# Patient Record
Sex: MALE | Race: WHITE
[De-identification: names, ages, dates, MRNs, and addresses within clinical notes are randomized per-mention and may not be internally consistent; named-entity substitution may affect disease eponyms.]

---

## 2018-02-15 ENCOUNTER — HOSPITAL ENCOUNTER (OUTPATIENT)
Dept: HOSPITAL 65 - RAD | Age: 63
Discharge: HOME | End: 2018-02-15
Attending: ORTHOPAEDIC SURGERY
Payer: COMMERCIAL

## 2018-02-15 DIAGNOSIS — M16.11: Primary | ICD-10-CM

## 2018-02-15 PROCEDURE — 20610 DRAIN/INJ JOINT/BURSA W/O US: CPT

## 2018-02-15 PROCEDURE — 77002 NEEDLE LOCALIZATION BY XRAY: CPT

## 2018-03-15 VITALS — SYSTOLIC BLOOD PRESSURE: 144 MMHG | DIASTOLIC BLOOD PRESSURE: 83 MMHG

## 2018-03-15 LAB
ALP INTEST CFR SERPL: 57 U/L (ref 50–136)
ALT SERPL-CCNC: 19 U/L (ref 12–78)
AST SERPL-CCNC: 17 U/L (ref 0–35)
BASOPHILS # BLD AUTO: 0 10^3/UL (ref 0–0.1)
BASOPHILS NFR BLD AUTO: 0.8 % (ref 0–0.2)
CALCIUM SERPL-MCNC: 8.9 MG/DL (ref 8.4–10.5)
CO2 BLDA-SCNC: 26.4 MMOL/L (ref 20–32)
EOSINOPHIL # BLD AUTO: 0.2 10^3/UL (ref 0–0.2)
EOSINOPHIL NFR BLD AUTO: 3.6 % (ref 0–5)
ERYTHROCYTE [DISTWIDTH] IN BLOOD BY AUTOMATED COUNT: 12 % (ref 11.5–14.5)
GLUCOSE PRE 100 G GLC PO SERPL-MCNC: 99 MG/DL (ref 70–110)
HGB BLD-MCNC: 13.7 G/DL (ref 13.9–16.3)
LYMPHOCYTES # BLD AUTO: 1.7 10^3/UL (ref 1–4.8)
LYMPHOCYTES NFR BLD AUTO: 32.3 % (ref 24–44)
MCH RBC QN AUTO: 31.2 PG (ref 26–34)
MCHC RBC AUTO-ENTMCNC: 35.2 G/DL (ref 33–37)
MCV RBC AUTO: 88.6 FL (ref 78–100)
MONOCYTES # BLD AUTO: 0.9 10^3/UL (ref 0.3–0.8)
MONOCYTES NFR BLD AUTO: 17.1 % (ref 5–12)
NEUTROPHILS # BLD AUTO: 2.4 10^3/UL (ref 1.8–7.7)
NEUTROPHILS NFR BLD AUTO: 46.2 % (ref 41–85)
PLATELET # BLD AUTO: 252 10^3/UL (ref 150–400)
PMV BLD AUTO: 8.6 FL (ref 7.8–11)
WBC # BLD AUTO: 5.3 10^3/UL (ref 4.5–11)

## 2018-03-15 NOTE — PCM.EKG
[facility]

                                       

Test Date:    2018-03-15               Test Time:    16:03:45

Pat Name:     ESCOBAR MAJOR           Department:   

Patient ID:   [id number]          Room:         

Gender:       M                        Technician:   DENA

:          1955               Requested By: VIRGILIO DELATORRE

Order Number: [id number]            Reading MD:     

                                 Measurements

Intervals                              Axis          

Rate:         57                       P:            64

MN:           182                      QRS:          63

QRSD:         98                       T:            65

QT:           436                                    

QTc:          424                                    

                           Interpretive Statements

Sinus bradycardia

Otherwise normal ECG

No previous ECG available for comparison



Please click the below link to view image of tracing.

## 2018-03-19 ENCOUNTER — HOSPITAL ENCOUNTER (INPATIENT)
Dept: HOSPITAL 65 - MS | Age: 63
LOS: 3 days | Discharge: HOME | DRG: 470 | End: 2018-03-22
Attending: ORTHOPAEDIC SURGERY | Admitting: ORTHOPAEDIC SURGERY
Payer: COMMERCIAL

## 2018-03-19 VITALS — DIASTOLIC BLOOD PRESSURE: 97 MMHG | SYSTOLIC BLOOD PRESSURE: 178 MMHG

## 2018-03-19 VITALS — DIASTOLIC BLOOD PRESSURE: 99 MMHG | SYSTOLIC BLOOD PRESSURE: 163 MMHG

## 2018-03-19 VITALS — SYSTOLIC BLOOD PRESSURE: 119 MMHG | DIASTOLIC BLOOD PRESSURE: 67 MMHG

## 2018-03-19 VITALS — DIASTOLIC BLOOD PRESSURE: 71 MMHG | SYSTOLIC BLOOD PRESSURE: 123 MMHG

## 2018-03-19 VITALS — DIASTOLIC BLOOD PRESSURE: 74 MMHG | SYSTOLIC BLOOD PRESSURE: 118 MMHG

## 2018-03-19 VITALS — DIASTOLIC BLOOD PRESSURE: 62 MMHG | SYSTOLIC BLOOD PRESSURE: 118 MMHG

## 2018-03-19 VITALS — HEIGHT: 68 IN | WEIGHT: 177.38 LBS | BODY MASS INDEX: 26.88 KG/M2

## 2018-03-19 VITALS — SYSTOLIC BLOOD PRESSURE: 143 MMHG | DIASTOLIC BLOOD PRESSURE: 75 MMHG

## 2018-03-19 VITALS — SYSTOLIC BLOOD PRESSURE: 165 MMHG | DIASTOLIC BLOOD PRESSURE: 98 MMHG

## 2018-03-19 VITALS — DIASTOLIC BLOOD PRESSURE: 71 MMHG | SYSTOLIC BLOOD PRESSURE: 126 MMHG

## 2018-03-19 DIAGNOSIS — Z88.6: ICD-10-CM

## 2018-03-19 DIAGNOSIS — M16.11: Primary | ICD-10-CM

## 2018-03-19 DIAGNOSIS — I25.10: ICD-10-CM

## 2018-03-19 DIAGNOSIS — Z82.49: ICD-10-CM

## 2018-03-19 DIAGNOSIS — Z98.49: ICD-10-CM

## 2018-03-19 DIAGNOSIS — G40.909: ICD-10-CM

## 2018-03-19 DIAGNOSIS — D63.8: ICD-10-CM

## 2018-03-19 DIAGNOSIS — I10: ICD-10-CM

## 2018-03-19 DIAGNOSIS — Z88.2: ICD-10-CM

## 2018-03-19 DIAGNOSIS — Z80.0: ICD-10-CM

## 2018-03-19 DIAGNOSIS — Z96.642: ICD-10-CM

## 2018-03-19 LAB
CALCIUM SERPL-MCNC: 9.3 MG/DL (ref 8.4–10.5)
CO2 BLDA-SCNC: 25.6 MMOL/L (ref 20–32)
ERYTHROCYTE [DISTWIDTH] IN BLOOD BY AUTOMATED COUNT: 12.1 % (ref 11.5–14.5)
GLUCOSE PRE 100 G GLC PO SERPL-MCNC: 112 MG/DL (ref 70–110)
HGB BLD-MCNC: 12.6 G/DL (ref 13.9–16.3)
MCH RBC QN AUTO: 30.9 PG (ref 26–34)
MCHC RBC AUTO-ENTMCNC: 34.1 G/DL (ref 33–37)
MCV RBC AUTO: 90.7 FL (ref 78–100)
PLATELET # BLD AUTO: 218 10^3/UL (ref 150–400)
PMV BLD AUTO: 8.3 FL (ref 7.8–11)
WBC # BLD AUTO: 8.5 10^3/UL (ref 4.5–11)

## 2018-03-19 PROCEDURE — 64447 NJX AA&/STRD FEMORAL NRV IMG: CPT

## 2018-03-19 PROCEDURE — 93005 ELECTROCARDIOGRAM TRACING: CPT

## 2018-03-19 PROCEDURE — C1776 JOINT DEVICE (IMPLANTABLE): HCPCS

## 2018-03-19 PROCEDURE — 73502 X-RAY EXAM HIP UNI 2-3 VIEWS: CPT

## 2018-03-19 PROCEDURE — 76000 FLUOROSCOPY <1 HR PHYS/QHP: CPT

## 2018-03-19 PROCEDURE — G8987 SELF CARE CURRENT STATUS: HCPCS

## 2018-03-19 PROCEDURE — 36415 COLL VENOUS BLD VENIPUNCTURE: CPT

## 2018-03-19 PROCEDURE — 97166 OT EVAL MOD COMPLEX 45 MIN: CPT

## 2018-03-19 PROCEDURE — 97161 PT EVAL LOW COMPLEX 20 MIN: CPT

## 2018-03-19 PROCEDURE — G8988 SELF CARE GOAL STATUS: HCPCS

## 2018-03-19 PROCEDURE — A4338 INDWELLING CATHETER LATEX: HCPCS

## 2018-03-19 PROCEDURE — 80048 BASIC METABOLIC PNL TOTAL CA: CPT

## 2018-03-19 PROCEDURE — 80053 COMPREHEN METABOLIC PANEL: CPT

## 2018-03-19 PROCEDURE — 85027 COMPLETE CBC AUTOMATED: CPT

## 2018-03-19 PROCEDURE — 0SR9049 REPLACEMENT OF RIGHT HIP JOINT WITH CERAMIC ON POLYETHYLENE SYNTHETIC SUBSTITUTE, CEMENTED, OPEN APPROACH: ICD-10-PCS | Performed by: ORTHOPAEDIC SURGERY

## 2018-03-19 PROCEDURE — 87070 CULTURE OTHR SPECIMN AEROBIC: CPT

## 2018-03-19 PROCEDURE — 85025 COMPLETE CBC W/AUTO DIFF WBC: CPT

## 2018-03-19 RX ADMIN — ACETAMINOPHEN AND CODEINE PHOSPHATE PRN TABLET: 300; 60 TABLET ORAL at 23:20

## 2018-03-19 RX ADMIN — FENTANYL CITRATE PRN MCG: 50 INJECTION, SOLUTION INTRAMUSCULAR; INTRAVENOUS at 15:20

## 2018-03-19 RX ADMIN — LISINOPRIL SCH MG: 20 TABLET ORAL at 22:42

## 2018-03-19 RX ADMIN — ACETAMINOPHEN AND CODEINE PHOSPHATE PRN TABLET: 300; 60 TABLET ORAL at 14:29

## 2018-03-19 RX ADMIN — FENTANYL CITRATE PRN MCG: 50 INJECTION, SOLUTION INTRAMUSCULAR; INTRAVENOUS at 13:49

## 2018-03-19 RX ADMIN — CEFAZOLIN SCH MLS/HR: 1 INJECTION, POWDER, FOR SOLUTION INTRAVENOUS at 23:21

## 2018-03-19 RX ADMIN — LAMOTRIGINE SCH MG: 100 TABLET ORAL at 22:39

## 2018-03-19 RX ADMIN — AMLODIPINE BESYLATE SCH MG: 5 TABLET ORAL at 22:43

## 2018-03-19 RX ADMIN — ACETAMINOPHEN AND CODEINE PHOSPHATE PRN TABLET: 300; 60 TABLET ORAL at 19:13

## 2018-03-19 RX ADMIN — CEFAZOLIN SCH MLS/HR: 1 INJECTION, POWDER, FOR SOLUTION INTRAVENOUS at 15:59

## 2018-03-19 RX ADMIN — SPIRONOLACTONE SCH MG: 25 TABLET, FILM COATED ORAL at 22:42

## 2018-03-19 RX ADMIN — Medication SCH MG: at 22:43

## 2018-03-19 RX ADMIN — FENTANYL CITRATE PRN MCG: 50 INJECTION, SOLUTION INTRAMUSCULAR; INTRAVENOUS at 13:39

## 2018-03-19 NOTE — PRM.PN
Subjective


Subjective


Date:  Mar 19, 2018


Time:  19:35


Subjective


Awake and alert


Some hip pain


VSS


NVM+


HGB 12.4


Stable


Patient History:  


FH: lupus


  32 MOTHER, , Age:73


FH: lupus


  32 MOTHER, , Age:73


Hypertension


  32 MOTHER, , Age:73


  G8 BROTHER


No known health problems


  33 FATHER





No Family History of:


  Alzheimer's disease


  Asthma


  Cerebrovascular disorder


  Chronic obstructive pulmonary disease


  Congestive heart failure


  Diabetes insipidus


  Diabetes mellitus


  Parkinson's disease





VTE


VTE Risk Total Score:  2


VTE Risk Score


VTE Risk:


Score 0-1 = Low Risk


(Aggressive mobilization; early ambulation; no VTE prophylaxis required)


Score 2: Moderate Risk


(Intermittent/Pneumatic Compression Device OR Lovenox/Heparin/Coumadin)


Score 3-4: High Risk


(Intermittent/Pneumatic Compression Device AND Lovenox/Heparin/Coumadin)


Score  > or =5: Highest Risk


(Intermittent/Pneumatic Compression Device AND Lovenox/Heparin/Coumadin)





Review of Systems


Allergies:  


Coded Allergies:  


     Sulfa (Sulfonamide Antibiotics) (Verified  Allergy, Severe, RASH ON FACE, 3

/15/18)


     tramadol (Verified  Allergy, Severe, SEIZURES, 3/15/18)


Scheduled


Amlodipine Besylate (Amlodipine Besylate), 1 TAB PO DAILY, (Reported)


Carvedilol Phosphate (Coreg Cr), 2 CAP PO DAILY, (Reported)


Cetirizine Hcl (Zyrtec), 1 TAB PO DAILY, (Reported)


Guaifenesin (Mucinex), 1 TAB PO DAILY, (Reported)


Hydrochlorothiazide (Hydrochlorothiazide), 1 TAB PO DAILY, (Reported)


Lactobacillus Acidophilus (Probiotic), 1 EACH PO DAILY, (Reported)


Lamotrigine (Lamotrigine), 1 TAB PO BID, (Reported)


Lisinopril (Lisinopril), 1 TAB PO DAILY, (Reported)


Spironolactone 25MG (Aldactone 25MG), 1 TAB PO DAILY, (Reported)





Scheduled PRN


Acetaminophen (Acetaminophen), 2 TAB PO TID PRN for PAIN, (Reported)





Discontinued Medications


Acetaminophen With Codeine (Tylenol With Codeine #4 Tablet), 1-2 EACH PO Q4HR 

PRN for PAIN, (Reported)


   Discontinued Reason: No Longer Taking


Labetalol Hcl (Labetalol Hcl), 2 TAB PO BID, (Reported)


   Discontinued Reason: No Longer Taking


Multivitamin (Multi-Day Vitamins), 1 TAB PO DAILY, (Reported)


   Discontinued Reason: No Longer Taking





Objective


Vitals and I/O





Vital Sign - Last 24 Hours








 3/19/18 3/19/18 3/19/18 3/19/18





 07:45 07:45 08:56 09:01


 


Temp  98.7  


 


Pulse  65 59 58


 


Resp  18 18 18


 


B/P (MAP)  178/97 (124) 165/98 (120) 143/75 (97)


 


Pulse Ox  98 99 99


 


O2 Delivery Room Air Room Air Room Air Room Air


 


    





 3/19/18 3/19/18 3/19/18 3/19/18





 13:20 13:20 13:34 13:50


 


Temp  97.8 98.3 98.0


 


Pulse  72 60 65


 


Resp  16 17 16


 


B/P (MAP)  126/71 (89) 123/71 (88) 119/67 (84)


 


Pulse Ox  96 96 98


 


O2 Delivery  Face Tent Face Tent Nasal Cannula


 


O2 Flow Rate 10   


 


    





 3/19/18 3/19/18 3/19/18 3/19/18





 14:02 14:30 14:30 15:12


 


Pulse 65   


 


Resp 18   12


 


B/P (MAP) 118/62 (80)   


 


Pulse Ox 94   98


 


O2 Delivery Nasal Cannula Nasal Cannula Nasal Cannula 


 


O2 Flow Rate  2.00 2.00 





 3/19/18 3/19/18  





 15:12 15:30  


 


Temp  97.1  


 


Pulse 66 91  


 


Resp 12 20  


 


B/P (MAP)  118/74 (89)  


 


Pulse Ox 98 95  


 


O2 Delivery Nasal Cannula Room Air  


 


O2 Flow Rate 2.00   


 


FiO2 28   








Medication Reconciliation


Scheduled


Amlodipine Besylate (Amlodipine Besylate), 1 TAB PO DAILY, (Reported)


Carvedilol Phosphate (Coreg Cr), 2 CAP PO DAILY, (Reported)


Cetirizine Hcl (Zyrtec), 1 TAB PO DAILY, (Reported)


Guaifenesin (Mucinex), 1 TAB PO DAILY, (Reported)


Hydrochlorothiazide (Hydrochlorothiazide), 1 TAB PO DAILY, (Reported)


Lactobacillus Acidophilus (Probiotic), 1 EACH PO DAILY, (Reported)


Lamotrigine (Lamotrigine), 1 TAB PO BID, (Reported)


Lisinopril (Lisinopril), 1 TAB PO DAILY, (Reported)


Spironolactone 25MG (Aldactone 25MG), 1 TAB PO DAILY, (Reported)





Scheduled PRN


Acetaminophen (Acetaminophen), 2 TAB PO TID PRN for PAIN, (Reported)





Discontinued Medications


Acetaminophen With Codeine (Tylenol With Codeine #4 Tablet), 1-2 EACH PO Q4HR 

PRN for PAIN, (Reported)


   Discontinued Reason: No Longer Taking


Labetalol Hcl (Labetalol Hcl), 2 TAB PO BID, (Reported)


   Discontinued Reason: No Longer Taking


Multivitamin (Multi-Day Vitamins), 1 TAB PO DAILY, (Reported)


   Discontinued Reason: No Longer Taking





Course


Blood Pressure Systolic:  118


Blood Pressure Diastolic:  74


Blood Pressure Mean:  89





Assessment/Plan


Assessment/Plan


Patient History:  


FH: lupus


  32 MOTHER, , Age:73


FH: lupus


  32 MOTHER, , Age:73


Hypertension


  32 MOTHER, , Age:73


  G8 BROTHER


No known health problems


  33 FATHER





No Family History of:


  Alzheimer's disease


  Asthma


  Cerebrovascular disorder


  Chronic obstructive pulmonary disease


  Congestive heart failure


  Diabetes insipidus


  Diabetes mellitus


  Parkinson's disease











VIRGILIO DELATORRE MD Mar 19, 2018 19:36

## 2018-03-19 NOTE — NUR
ARRIVAL

Pt ARRIVED TO THE UNIT ASSISTED BY OR NURSES NILS RN AND LEONORA RN IN HOSPITAL BED, PT AO X 
4, SURGICAL DRESSING ON ON R HIP, SAND BAG ON AS PER DR. DELATORRE ORDER TILL 4 PM TODAY, 
ORIENTED TO ROOM, BATHROOM, CALL LIGHT, TV REMOTE. OFFERED FLUIDS AND SNACKS. IV LR INFUSING 
TO L FA WITH 20 G, ARIE FOOT SCD ON. INITIATED SPECIAL V/S. TAPIA INTACT DRAINING CLEAR 
YELLOW URINE. WILL CONTINUE TO MONITOR THE Pt. HEAD TO TOE ASSESSMENT COMPLETED.

## 2018-03-19 NOTE — DIREP
PROCEDURE:XRAY HIP MIN 2VW-RT

 

COMPARISON:None.

 

INDICATIONS:postop

 

FINDINGS:

BONES:Status post total right hip replacement.  Hardware intact.  Alignment is 

satisfactory

JOINTS:Normal.

SOFT TISSUES:Normal.

OTHER:No additional findings.

 

CONCLUSION:Satisfactory total right hip replacement

 

 

 

Dictated by: Jayden Katz MD on 03/19/2018 at 02:06 PM     

Electronically Signed By: Jayden Katz MD on 03/19/2018 at 02:07 PM

## 2018-03-19 NOTE — OPH
DATE OF SURGERY:  03/19/2018



PREOPERATIVE DIAGNOSIS:  Osteoarthritis of the right hip.



POSTOPERATIVE DIAGNOSIS:  Osteoarthritis of the right hip.



OPERATIVE PROCEDURE:  Right total hip arthroplasty.



SURGEON:  Mich Dougherty MD



ANESTHESIA:  General endotracheal.



BLOOD LOSS:  500 mL.



COMPLICATIONS:  None.



IMPLANTS:  We used a size 4 Medacta AMIS stem cemented with the 56 Versafit cup

and a 56 dual-articulating liner.  The head was ceramic, 28 mm short neck.



DESCRIPTION OF INDICATIONS:  The patient is a 62-year-old male with pain about

the right hip for several years intermittently, but quite severe in the last 3

months.  He has gotten to the point where he had to use a cane to ambulate.  He

rated his pain as 8-9/10.  He takes Tylenol as well as Advil for the pain.  He

had pain with just household ambulation.  He had difficulty walking even in his

home, also had night pain.  The patient's x-rays show that he was bone-on-bone

medially and inferiorly about the right hip as well as superior and lateral

osteophytes about the acetabulum.  The patient was taken to the operating room

for total hip arthroplasty for pain relief.



DESCRIPTION OF PROCEDURE:  The patient was placed in the  operating table in the

supine position.  A general endotracheal anesthetic was induced without

difficulty.  The patient had the right foot well padded with cast padding as

well as wet wrap.  He was placed in the traction boot and reinforced with a

small piece of egg crate mattress.  The patient then had the right lower

extremity attached to the traction unit.



The patient had the right lower extremity then sterilely prepped and draped.



The patient had an anterior incision made about the hip.  Incision was taken

through the skin and subcutaneous tissues.  The bleeding was controlled with

cautery.  The tensor fascia was opened and the muscle belly was retracted

laterally.  The rectus fascia was opened and the rectus muscle was retracted

medially.  The circumflex vessels were identified and coagulated with the

Aquamantys device.  The fat pad over the anterior capsule was then excised.  A

capsular incision was made and the capsule was retracted proximally and

laterally.  Femoral neck cut was then made with the power saw.  The head was

removed with a corkscrew device.  The fovea was cleared of any soft tissue and

the bleeding was controlled with the Aquamantys device.  The labrum was excised

and the piriformis tendon was released.  The patient then had the acetabulum

sequentially reamed up to a 56.  56 trial cup had a good fit.  The 56 acetabular

component was then impacted into position using a 56 Versafit cup.  The patient

then had the anterior capsule released.  The hip was placed in maximal external

rotation as well as hyperextension.  The canal was opened with a curette and a

box chisel.  The patient then had the canal rasped with the starter rasp and

then it was sequentially rasped up to a size 4.  We did a trial reduction with a

4 stem and a minus neck length.  There was good stability clinically. 

Radiographically, the sizing of the components and the positioning of the

components appeared to be satisfactory.  The leg lengths seemed to be

satisfactory.



The trial components were then removed.  The wounds were copiously irrigated. 

The bone ends were dried.  A small cement restrictor was placed at 12 cm.  The

canal was then again irrigated and dried.  The cement was introduced and hand

packed.  The stem was then cemented into position.  Once the cement was removed

and the cement had hardened, we did another trial reduction with the minus neck

length and again there was good stability and the leg length appeared

appropriate.



The minus small head 28 mm ceramic with the 56 dual-articulating cup was then

impacted on to the Man taper neck.  The hip was then reduced.  Again, there

was good stability and again radiographically the sizing and the leg lengths

appeared appropriate.  The patient then had the wounds irrigated.  The Betadine

was allowed to stay in the wound for 3 minutes.  The patient then had 20 mL of

tranexamic acid placed in the capsule.  The capsule was closed with a #2 PDS in

an interrupted manner.  The remaining 30 mL of tranexamic acid was then placed

beneath the tensor fascia and the tensor fascia was closed with #1 PDS barbed in

a running manner.  The wounds were irrigated and then the subcutaneous was

closed with 2-0 Monocryl barbed in a running manner.  The skin was closed with

staples.  A large Aquacel dressing was applied and the patient was extubated in

the operating room, sent to recovery in stable condition.





______________________________

Mich Dougherty MD



DR:  TIERNEY/ora  JOB# [id number]  [id number]

DD:  03/19/2018 13:32  DT:  03/19/2018 16:14

## 2018-03-19 NOTE — NUR
REPORT

REPORT RECEIVED FROM AMELIA GUZMAN LVN. PT IN BED WITH WIFE AND BROTHER AT BEDSIDE. REQUESTING 
PAIN MEDICATION AT THIS TIME BUT NO OTHER DISTRESS NOTED AT THIS TIME. ASSUMED CARE OF PT AT 
THIS TIME.

-------------------------------------------------------------------------------

Addendum: 03/20/18 at 0037 by SUKHDEEP PATEL RN-MedSurg RN

-------------------------------------------------------------------------------

REPORT RECEIVED FROM DAYNE LAFLEUR RN.

## 2018-03-19 NOTE — NUR
C/O OF PAIN 

Pt C/O "ACHING PAIN AT 7/10 "RATING SCALE ON R HIP, ADMINISTERED TYLENOL #4 2 TBS PRN AS PER 
ORDER, WILL REASSESS THE PAIN.

## 2018-03-19 NOTE — HPH
ADMIT DATE:  03/19/2018



CHIEF COMPLAINT:  Painful right hip.



HISTORY OF PRESENT ILLNESS:  A 62-year-old male with right hip pain for several

years.  It has gotten worse in the last 3 months.  He is to the point now where

he has to use a cane to ambulate.  He has night pain as well as pain with

household ambulation.  The patient rates his pain as 8-9/10 in the last 2

months.  He has been on ibuprofen 600 mg 3 times a day as well as Tylenol and

been on a home exercise program.  He has gotten progressively worse to the point

where he cannot do any of his daily activities.  He has had to use a cane in the

last 2-3 weeks and is being admitted for right total hip arthroplasty.



MEDICATIONS:  His medications include Coreg, lisinopril, amlodipine.



PAST MEDICAL HISTORY: Medical problems include hypertension as well as seizure

disorder.



PAST SURGICAL HISTORY:  Left total hip arthroplasty, cataract, ORIF of his left

clavicle, right knee arthroscopy, herniorrhaphy.



ALLERGIES:  THE PATIENT IS ALLERGIC TO SULFA AS WELL AS TRAMADOL.



FAMILY HISTORY:  Positive for pancreatic cancer, lupus and hypertension.



REVIEW OF SYSTEMS:  Negative for chest pain, shortness of breath, nausea,

vomiting, melena, hematochezia, dysuria, hematuria, fever, chills and weight

loss.



PHYSICAL EXAMINATION:

GENERAL:  Shows a healthy 62-year-old male in no acute distress.

HEENT:  Within normal limits.

CHEST:  Clear to auscultation.

HEART:  Regular rate and rhythm, no murmur.

ABDOMEN:  Soft and nontender.

EXTREMITIES:  Right hip has full extension.  He can flex the hip to 110 degrees.

 His external rotation is 30, he has 20 degrees of internal rotation with pain. 

There is no shortening.

NEUROLOGIC:  He is awake and alert.  He is oriented x 3.  His cranial nerves

2-12 are grossly intact.  He has 5/5 strength in all muscle groups of both lower

and upper extremities bilaterally, symmetric.  



IMAGING STUDIES:  X-rays in his hip as well as his MRI scan show severe

arthritis about the right hip with bone-on-bone medially and inferiorly.  He

also has a labral tear.



ASSESSMENT:  Osteoarthritis, right hip.  Other diagnoses include hypertension

and seizure disorder.



PLAN:  The patient is being admitted for right total hip arthroplasty.  He

understands the risk and hazards involved.





______________________________

Mich Dougherty MD



DR:  TIERNEY/ora  JOB# [id number]  [id number]

DD:  03/19/2018 13:24  DT:  03/19/2018 16:02

## 2018-03-20 VITALS — SYSTOLIC BLOOD PRESSURE: 142 MMHG | DIASTOLIC BLOOD PRESSURE: 81 MMHG

## 2018-03-20 VITALS — SYSTOLIC BLOOD PRESSURE: 132 MMHG | DIASTOLIC BLOOD PRESSURE: 83 MMHG

## 2018-03-20 VITALS — SYSTOLIC BLOOD PRESSURE: 144 MMHG | DIASTOLIC BLOOD PRESSURE: 84 MMHG

## 2018-03-20 VITALS — SYSTOLIC BLOOD PRESSURE: 153 MMHG | DIASTOLIC BLOOD PRESSURE: 83 MMHG

## 2018-03-20 VITALS — DIASTOLIC BLOOD PRESSURE: 88 MMHG | SYSTOLIC BLOOD PRESSURE: 148 MMHG

## 2018-03-20 VITALS — SYSTOLIC BLOOD PRESSURE: 147 MMHG | DIASTOLIC BLOOD PRESSURE: 86 MMHG

## 2018-03-20 LAB
ERYTHROCYTE [DISTWIDTH] IN BLOOD BY AUTOMATED COUNT: 12.1 % (ref 11.5–14.5)
HGB BLD-MCNC: 11.6 G/DL (ref 13.9–16.3)
MCH RBC QN AUTO: 30.9 PG (ref 26–34)
MCHC RBC AUTO-ENTMCNC: 34.2 G/DL (ref 33–37)
MCV RBC AUTO: 90.2 FL (ref 78–100)
PLATELET # BLD AUTO: 215 10^3/UL (ref 150–400)
PMV BLD AUTO: 8.3 FL (ref 7.8–11)
WBC # BLD AUTO: 9.2 10^3/UL (ref 4.5–11)

## 2018-03-20 RX ADMIN — FAMOTIDINE SCH MG: 20 TABLET, FILM COATED ORAL at 09:04

## 2018-03-20 RX ADMIN — LAMOTRIGINE SCH MG: 100 TABLET ORAL at 20:55

## 2018-03-20 RX ADMIN — Medication SCH MG: at 09:03

## 2018-03-20 RX ADMIN — GUAIFENESIN SCH MG: 600 TABLET, EXTENDED RELEASE ORAL at 09:00

## 2018-03-20 RX ADMIN — RIVAROXABAN SCH MG: 10 TABLET, FILM COATED ORAL at 09:03

## 2018-03-20 RX ADMIN — ACETAMINOPHEN AND CODEINE PHOSPHATE PRN TABLET: 300; 60 TABLET ORAL at 12:10

## 2018-03-20 RX ADMIN — CARVEDILOL SCH MG: 12.5 TABLET, FILM COATED ORAL at 09:04

## 2018-03-20 RX ADMIN — ACETAMINOPHEN AND CODEINE PHOSPHATE PRN TABLET: 300; 60 TABLET ORAL at 03:14

## 2018-03-20 RX ADMIN — Medication SCH MG: at 09:00

## 2018-03-20 RX ADMIN — LAMOTRIGINE SCH MG: 100 TABLET ORAL at 09:03

## 2018-03-20 RX ADMIN — ACETAMINOPHEN AND CODEINE PHOSPHATE PRN TABLET: 300; 60 TABLET ORAL at 18:07

## 2018-03-20 RX ADMIN — ACETAMINOPHEN AND CODEINE PHOSPHATE PRN TABLET: 300; 60 TABLET ORAL at 06:59

## 2018-03-20 RX ADMIN — CEFAZOLIN SCH MLS/HR: 1 INJECTION, POWDER, FOR SOLUTION INTRAVENOUS at 08:00

## 2018-03-20 RX ADMIN — LORATADINE SCH MG: 10 TABLET ORAL at 09:02

## 2018-03-20 RX ADMIN — CARVEDILOL SCH MG: 12.5 TABLET, FILM COATED ORAL at 20:56

## 2018-03-20 RX ADMIN — TEMAZEPAM PRN MG: 15 CAPSULE ORAL at 22:03

## 2018-03-20 RX ADMIN — SPIRONOLACTONE SCH MG: 25 TABLET, FILM COATED ORAL at 09:02

## 2018-03-20 RX ADMIN — ACETAMINOPHEN AND CODEINE PHOSPHATE PRN TABLET: 300; 60 TABLET ORAL at 22:02

## 2018-03-20 RX ADMIN — AMLODIPINE BESYLATE SCH MG: 5 TABLET ORAL at 09:02

## 2018-03-20 RX ADMIN — Medication SCH EACH: at 09:00

## 2018-03-20 RX ADMIN — LISINOPRIL SCH MG: 20 TABLET ORAL at 09:01

## 2018-03-20 NOTE — NUR
Post op pain rounds POD #1 after hip surgery. Pt is lying in bed with family in room. Pt 
states pain was well controlled with block. Pt came by yesterday but pt was still too sleepy 
to ambulate. he was concerned with nausea from previous surgeries but was very greatful that 
he didn't experience any with this. No complications noted. pt is very pleased with 
anesthetic and pain block.

## 2018-03-20 NOTE — PRM.PN
Subjective


Subjective


Date:  Mar 20, 2018


Time:  08:46


Subjective


Pain ok this am


VSS


NVM+


HGB 11.6


Start PT


Patient History:  


FH: lupus


  32 MOTHER, , Age:73


FH: lupus


  32 MOTHER, , Age:73


Hypertension


  32 MOTHER, , Age:73


  G8 BROTHER


No known health problems


  33 FATHER





No Family History of:


  Alzheimer's disease


  Asthma


  Cerebrovascular disorder


  Chronic obstructive pulmonary disease


  Congestive heart failure


  Diabetes insipidus


  Diabetes mellitus


  Parkinson's disease





VTE


VTE Risk Total Score:  2


VTE Risk Score


VTE Risk:


Score 0-1 = Low Risk


(Aggressive mobilization; early ambulation; no VTE prophylaxis required)


Score 2: Moderate Risk


(Intermittent/Pneumatic Compression Device OR Lovenox/Heparin/Coumadin)


Score 3-4: High Risk


(Intermittent/Pneumatic Compression Device AND Lovenox/Heparin/Coumadin)


Score  > or =5: Highest Risk


(Intermittent/Pneumatic Compression Device AND Lovenox/Heparin/Coumadin)





Review of Systems


Allergies:  


Coded Allergies:  


     Sulfa (Sulfonamide Antibiotics) (Verified  Allergy, Severe, RASH ON FACE, 3

/15/18)


     tramadol (Verified  Allergy, Severe, SEIZURES, 3/15/18)


Scheduled


Amlodipine Besylate (Amlodipine Besylate), 1 TAB PO DAILY, (Reported)


Carvedilol Phosphate (Coreg Cr), 2 CAP PO DAILY, (Reported)


Cetirizine Hcl (Zyrtec), 1 TAB PO DAILY, (Reported)


Guaifenesin (Mucinex), 1 TAB PO DAILY, (Reported)


Hydrochlorothiazide (Hydrochlorothiazide), 1 TAB PO DAILY, (Reported)


Lactobacillus Acidophilus (Probiotic), 1 EACH PO DAILY, (Reported)


Lamotrigine (Lamotrigine), 1 TAB PO BID, (Reported)


Lisinopril (Lisinopril), 1 TAB PO DAILY, (Reported)


Spironolactone 25MG (Aldactone 25MG), 1 TAB PO DAILY, (Reported)





Scheduled PRN


Acetaminophen (Acetaminophen), 2 TAB PO TID PRN for PAIN, (Reported)





Discontinued Medications


Acetaminophen With Codeine (Tylenol With Codeine #4 Tablet), 1-2 EACH PO Q4HR 

PRN for PAIN, (Reported)


   Discontinued Reason: No Longer Taking


Labetalol Hcl (Labetalol Hcl), 2 TAB PO BID, (Reported)


   Discontinued Reason: No Longer Taking


Multivitamin (Multi-Day Vitamins), 1 TAB PO DAILY, (Reported)


   Discontinued Reason: No Longer Taking





Objective


Vitals and I/O





Vital Sign - Last 24 Hours








 3/19/18 3/19/18 3/19/18 3/19/18





 08:56 09:01 13:20 13:20


 


Temp    97.8


 


Pulse 59 58  72


 


Resp 18 18  16


 


B/P (MAP) 165/98 (120) 143/75 (97)  126/71 (89)


 


Pulse Ox 99 99  96


 


O2 Delivery Room Air Room Air  Face Tent


 


O2 Flow Rate   10 


 


    





 3/19/18 3/19/18 3/19/18 3/19/18





 13:34 13:50 14:02 14:30


 


Temp 98.3 98.0  


 


Pulse 60 65 65 


 


Resp 17 16 18 


 


B/P (MAP) 123/71 (88) 119/67 (84) 118/62 (80) 


 


Pulse Ox 96 98 94 


 


O2 Delivery Face Tent Nasal Cannula Nasal Cannula Nasal Cannula


 


O2 Flow Rate    2.00


 


    





 3/19/18 3/19/18 3/19/18 3/19/18





 14:30 15:12 15:12 15:30


 


Temp    97.1


 


Pulse   66 91


 


Resp  12 12 20


 


B/P (MAP)    118/74 (89)


 


Pulse Ox  98 98 95


 


O2 Delivery Nasal Cannula  Nasal Cannula Room Air


 


O2 Flow Rate 2.00  2.00 


 


FiO2   28 


 


    





 3/19/18 3/19/18 3/19/18 3/19/18





 20:00 21:00 21:28 22:42


 


Temp 98.0   


 


Pulse 78  91 


 


Resp 18  20 


 


B/P (MAP) 163/99 (120)   163/99


 


Pulse Ox 97  95 


 


O2 Delivery Nasal Canula Room Air Room Air 


 


    





 3/19/18 3/19/18 3/19/18 3/20/18





 22:42 22:43 22:43 00:00


 


Temp    98.9


 


Pulse  78  72


 


Resp    18


 


B/P (MAP) 163/99 163/99 163/99 148/88 (108)


 


Pulse Ox    98


 


O2 Delivery    Nasal Canula


 


    





 3/20/18 3/20/18 3/20/18 





 04:00 07:26 07:40 


 


Temp 98.4 98.6  


 


Pulse 67 65  


 


Resp 18 18  


 


B/P (MAP) 144/84 (104) 147/86 (106)  


 


Pulse Ox 98 94  


 


O2 Delivery Room Air Nasal Canula Room Air 














Intake and Output   


 


 3/19/18 3/19/18 3/20/18





 15:00 23:00 07:00


 


Intake Total 9840 ml 1780 ml 1550 ml


 


Output Total 500 ml 550 ml 1700 ml


 


Balance 9340 ml 1230 ml -150 ml








Medication Reconciliation


Scheduled


Amlodipine Besylate (Amlodipine Besylate), 1 TAB PO DAILY, (Reported)


Carvedilol Phosphate (Coreg Cr), 2 CAP PO DAILY, (Reported)


Cetirizine Hcl (Zyrtec), 1 TAB PO DAILY, (Reported)


Guaifenesin (Mucinex), 1 TAB PO DAILY, (Reported)


Hydrochlorothiazide (Hydrochlorothiazide), 1 TAB PO DAILY, (Reported)


Lactobacillus Acidophilus (Probiotic), 1 EACH PO DAILY, (Reported)


Lamotrigine (Lamotrigine), 1 TAB PO BID, (Reported)


Lisinopril (Lisinopril), 1 TAB PO DAILY, (Reported)


Spironolactone 25MG (Aldactone 25MG), 1 TAB PO DAILY, (Reported)





Scheduled PRN


Acetaminophen (Acetaminophen), 2 TAB PO TID PRN for PAIN, (Reported)





Discontinued Medications


Acetaminophen With Codeine (Tylenol With Codeine #4 Tablet), 1-2 EACH PO Q4HR 

PRN for PAIN, (Reported)


   Discontinued Reason: No Longer Taking


Labetalol Hcl (Labetalol Hcl), 2 TAB PO BID, (Reported)


   Discontinued Reason: No Longer Taking


Multivitamin (Multi-Day Vitamins), 1 TAB PO DAILY, (Reported)


   Discontinued Reason: No Longer Taking





Course


Blood Pressure Systolic:  147


Blood Pressure Diastolic:  86


Blood Pressure Mean:  106





Assessment/Plan


Assessment/Plan


Patient History:  


FH: lupus


  32 MOTHER, , Age:73


FH: lupus


  32 MOTHER, , Age:73


Hypertension


  32 MOTHER, , Age:73


  G8 BROTHER


No known health problems


  33 FATHER





No Family History of:


  Alzheimer's disease


  Asthma


  Cerebrovascular disorder


  Chronic obstructive pulmonary disease


  Congestive heart failure


  Diabetes insipidus


  Diabetes mellitus


  Parkinson's disease











VIRGILIO DELATORRE MD Mar 20, 2018 08:46

## 2018-03-20 NOTE — NUR
Decreased quad control today with knee buckling.

-------------------------------------------------------------------------------

Signed:    03/20/18 at 1502 by Gerri Steiner PTA PT

-------------------------------------------------------------------------------

## 2018-03-20 NOTE — NUR
DISCHARGE PLAN

CM VISITED WITH PT AND  CONCERNING PTS DISCHARGE PLAN AND NEED. PT STATED HE LIVES @ 
HOME WITH HIS WIFE AND HE IS INDEPENDENT ON ADLS. HE IS STILL WORKING DAILY AS AN LOAN 
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 HE STATED HE DOES NOT CURRENTLY USE ANY TYPE OF DME 
FOR ASSISTANCE, BUT HE HAS IT ALL IN PLACE @ HOME FROM PREVIOUS THR IF NEEDED, INCLUDING A 
WALKER WITH WHEELS THAT IS @ HIS BEDSIDE. PT DENIES NEEDING ANY ADDITIONAL RESOURCES @ THIS 
TIME WITH CONTACT INFORMATION PROVIDED. CURRENT GOAL FOR PT IS TO DISCHARGE BACK HOME WITH 
WIFE TO ROUTINE SELF CARE UPON DISCHARGE WITH ASSISTANCE FROM WIFE AS NEEDED PER PTS 
REQUEST. CM TO CONTINUE TO FOLLOW PTS PLAN OF CARE AND FOR FURTHER DISCHARGE NEEDS.

## 2018-03-21 VITALS — DIASTOLIC BLOOD PRESSURE: 87 MMHG | SYSTOLIC BLOOD PRESSURE: 151 MMHG

## 2018-03-21 VITALS — DIASTOLIC BLOOD PRESSURE: 87 MMHG | SYSTOLIC BLOOD PRESSURE: 140 MMHG

## 2018-03-21 VITALS — SYSTOLIC BLOOD PRESSURE: 136 MMHG | DIASTOLIC BLOOD PRESSURE: 72 MMHG

## 2018-03-21 VITALS — DIASTOLIC BLOOD PRESSURE: 94 MMHG | SYSTOLIC BLOOD PRESSURE: 168 MMHG

## 2018-03-21 VITALS — DIASTOLIC BLOOD PRESSURE: 87 MMHG | SYSTOLIC BLOOD PRESSURE: 141 MMHG

## 2018-03-21 LAB
BASOPHILS # BLD AUTO: 0 10^3/UL (ref 0–0.1)
BASOPHILS NFR BLD AUTO: 0.3 % (ref 0–0.2)
EOSINOPHIL # BLD AUTO: 0.1 10^3/UL (ref 0–0.2)
EOSINOPHIL NFR BLD AUTO: 1.2 % (ref 0–5)
ERYTHROCYTE [DISTWIDTH] IN BLOOD BY AUTOMATED COUNT: 12.4 % (ref 11.5–14.5)
HGB BLD-MCNC: 11.8 G/DL (ref 13.9–16.3)
LYMPHOCYTES # BLD AUTO: 2 10^3/UL (ref 1–4.8)
LYMPHOCYTES NFR BLD AUTO: 26.2 % (ref 24–44)
MCH RBC QN AUTO: 30.8 PG (ref 26–34)
MCHC RBC AUTO-ENTMCNC: 33.4 G/DL (ref 33–37)
MCV RBC AUTO: 92.2 FL (ref 78–100)
MONOCYTES # BLD AUTO: 1 10^3/UL (ref 0.3–0.8)
MONOCYTES NFR BLD AUTO: 12.6 % (ref 5–12)
NEUTROPHILS # BLD AUTO: 4.6 10^3/UL (ref 1.8–7.7)
NEUTROPHILS NFR BLD AUTO: 59.4 % (ref 41–85)
PLATELET # BLD AUTO: 214 10^3/UL (ref 150–400)
PMV BLD AUTO: 8.4 FL (ref 7.8–11)
WBC # BLD AUTO: 7.8 10^3/UL (ref 4.5–11)

## 2018-03-21 RX ADMIN — LAMOTRIGINE SCH MG: 100 TABLET ORAL at 21:12

## 2018-03-21 RX ADMIN — Medication SCH MG: at 08:26

## 2018-03-21 RX ADMIN — FAMOTIDINE SCH MG: 20 TABLET, FILM COATED ORAL at 08:26

## 2018-03-21 RX ADMIN — ACETAMINOPHEN AND CODEINE PHOSPHATE PRN TABLET: 300; 60 TABLET ORAL at 10:10

## 2018-03-21 RX ADMIN — ACETAMINOPHEN AND CODEINE PHOSPHATE PRN TABLET: 300; 60 TABLET ORAL at 15:27

## 2018-03-21 RX ADMIN — AMLODIPINE BESYLATE SCH MG: 5 TABLET ORAL at 08:25

## 2018-03-21 RX ADMIN — GUAIFENESIN SCH MG: 600 TABLET, EXTENDED RELEASE ORAL at 08:27

## 2018-03-21 RX ADMIN — SPIRONOLACTONE SCH MG: 25 TABLET, FILM COATED ORAL at 08:25

## 2018-03-21 RX ADMIN — ACETAMINOPHEN AND CODEINE PHOSPHATE PRN TABLET: 300; 60 TABLET ORAL at 01:58

## 2018-03-21 RX ADMIN — LAMOTRIGINE SCH MG: 100 TABLET ORAL at 08:26

## 2018-03-21 RX ADMIN — CARVEDILOL SCH MG: 12.5 TABLET, FILM COATED ORAL at 21:14

## 2018-03-21 RX ADMIN — Medication SCH EACH: at 08:27

## 2018-03-21 RX ADMIN — ACETAMINOPHEN AND CODEINE PHOSPHATE PRN TABLET: 300; 60 TABLET ORAL at 19:52

## 2018-03-21 RX ADMIN — LORATADINE SCH MG: 10 TABLET ORAL at 08:26

## 2018-03-21 RX ADMIN — CARVEDILOL SCH MG: 12.5 TABLET, FILM COATED ORAL at 08:26

## 2018-03-21 RX ADMIN — ACETAMINOPHEN AND CODEINE PHOSPHATE PRN TABLET: 300; 60 TABLET ORAL at 06:02

## 2018-03-21 RX ADMIN — RIVAROXABAN SCH MG: 10 TABLET, FILM COATED ORAL at 08:25

## 2018-03-21 RX ADMIN — TEMAZEPAM PRN MG: 15 CAPSULE ORAL at 21:14

## 2018-03-21 RX ADMIN — LISINOPRIL SCH MG: 20 TABLET ORAL at 08:25

## 2018-03-21 NOTE — NUR
STATUS

PT IS DUE TO VOID AT 1430. PT STATES HE IS STARTING TO FEEL SOME DISCOMFORT. EDUCATED PT THE 
POSSIBILITY OF A STRAIGHT CATH TO RECEIVE THE PRESSURE. PT UNDERSTANDS AND WILL ATTEMPT TO 
VOID BEFORE CUT OFF TIME

## 2018-03-21 NOTE — PRM.PN
Subjective


Subjective


Date:  Mar 21, 2018


Time:  08:53


Subjective


Pain better


Afebrile


More ambulatory


HGB11.8  Stable


Cont with PT


Patient History:  


FH: lupus


  32 MOTHER, , Age:73


FH: lupus


  32 MOTHER, , Age:73


Hypertension


  32 MOTHER, , Age:73


  G8 BROTHER


No known health problems


  33 FATHER





No Family History of:


  Alzheimer's disease


  Asthma


  Cerebrovascular disorder


  Chronic obstructive pulmonary disease


  Congestive heart failure


  Diabetes insipidus


  Diabetes mellitus


  Parkinson's disease





VTE


VTE Risk Total Score:  2


VTE Risk Score


VTE Risk:


Score 0-1 = Low Risk


(Aggressive mobilization; early ambulation; no VTE prophylaxis required)


Score 2: Moderate Risk


(Intermittent/Pneumatic Compression Device OR Lovenox/Heparin/Coumadin)


Score 3-4: High Risk


(Intermittent/Pneumatic Compression Device AND Lovenox/Heparin/Coumadin)


Score  > or =5: Highest Risk


(Intermittent/Pneumatic Compression Device AND Lovenox/Heparin/Coumadin)





Review of Systems


Allergies:  


Coded Allergies:  


     Sulfa (Sulfonamide Antibiotics) (Verified  Allergy, Severe, RASH ON FACE, 3

/15/18)


     tramadol (Verified  Allergy, Severe, SEIZURES, 3/15/18)


Scheduled


Amlodipine Besylate (Amlodipine Besylate), 1 TAB PO DAILY, (Reported)


Carvedilol Phosphate (Coreg Cr), 2 CAP PO DAILY, (Reported)


Cetirizine Hcl (Zyrtec), 1 TAB PO DAILY, (Reported)


Guaifenesin (Mucinex), 1 TAB PO DAILY, (Reported)


Hydrochlorothiazide (Hydrochlorothiazide), 1 TAB PO DAILY, (Reported)


Lactobacillus Acidophilus (Probiotic), 1 EACH PO DAILY, (Reported)


Lamotrigine (Lamotrigine), 1 TAB PO BID, (Reported)


Lisinopril (Lisinopril), 1 TAB PO DAILY, (Reported)


Spironolactone 25MG (Aldactone 25MG), 1 TAB PO DAILY, (Reported)





Scheduled PRN


Acetaminophen (Acetaminophen), 2 TAB PO TID PRN for PAIN, (Reported)





Discontinued Medications


Acetaminophen With Codeine (Tylenol With Codeine #4 Tablet), 1-2 EACH PO Q4HR 

PRN for PAIN, (Reported)


   Discontinued Reason: No Longer Taking


Labetalol Hcl (Labetalol Hcl), 2 TAB PO BID, (Reported)


   Discontinued Reason: No Longer Taking


Multivitamin (Multi-Day Vitamins), 1 TAB PO DAILY, (Reported)


   Discontinued Reason: No Longer Taking





Objective


Vitals and I/O





Vital Sign - Last 24 Hours








 3/20/18 3/20/18 3/20/18 3/20/18





 09:01 09:02 09:02 09:03


 


Pulse  65  


 


B/P (MAP) 147/86 147/86 147/86 147/86





 3/20/18 3/20/18 3/20/18 3/20/18





 09:04 09:44 11:45 12:30


 


Temp   98.1 


 


Pulse 65 71 56 


 


Resp  18 18 


 


B/P (MAP) 147/86  142/81 (101) 


 


Pulse Ox  99 97 


 


O2 Delivery  Room Air Nasal [place] Room Air


 


FiO2  21  


 


    





 3/20/18 3/20/18 3/20/18 3/20/18





 15:56 18:58 19:33 20:56


 


Temp 98.5  98.4 


 


Pulse 58 65 61 61


 


Resp 18 18 18 


 


B/P (MAP) 153/83 (106)  132/83 (99) 132/83


 


Pulse Ox 96 96 95 


 


O2 Delivery Room Air Room Air Room Air 


 


    





 3/20/18 3/21/18 3/21/18 3/21/18





 23:15 00:22 04:15 07:31


 


Temp  98.2 98.5 98.6


 


Pulse  55 61 56


 


Resp  18 18 18


 


B/P (MAP)  136/72 (93) 141/87 (105) 168/94 (118)


 


Pulse Ox  94 93 95


 


O2 Delivery Room Air Room Air Room Air Room Air


 


    





 3/21/18 3/21/18 3/21/18 3/21/18





 07:35 08:25 08:25 08:25


 


Pulse  56  


 


B/P (MAP)  168/94 168/94 168/94


 


O2 Delivery Room Air   





 3/21/18 3/21/18  





 08:26 08:26  


 


Pulse  56  


 


B/P (MAP) 168/94 168/94  














Intake and Output   


 


 3/20/18 3/20/18 3/21/18





 15:00 23:00 07:00


 


Intake Total 594 ml 3600 ml 600 ml


 


Output Total 2200 ml 1700 ml 3100 ml


 


Balance -1606 ml 1900 ml -2500 ml








Medication Reconciliation


Scheduled


Amlodipine Besylate (Amlodipine Besylate), 1 TAB PO DAILY, (Reported)


Carvedilol Phosphate (Coreg Cr), 2 CAP PO DAILY, (Reported)


Cetirizine Hcl (Zyrtec), 1 TAB PO DAILY, (Reported)


Guaifenesin (Mucinex), 1 TAB PO DAILY, (Reported)


Hydrochlorothiazide (Hydrochlorothiazide), 1 TAB PO DAILY, (Reported)


Lactobacillus Acidophilus (Probiotic), 1 EACH PO DAILY, (Reported)


Lamotrigine (Lamotrigine), 1 TAB PO BID, (Reported)


Lisinopril (Lisinopril), 1 TAB PO DAILY, (Reported)


Spironolactone 25MG (Aldactone 25MG), 1 TAB PO DAILY, (Reported)





Scheduled PRN


Acetaminophen (Acetaminophen), 2 TAB PO TID PRN for PAIN, (Reported)





Discontinued Medications


Acetaminophen With Codeine (Tylenol With Codeine #4 Tablet), 1-2 EACH PO Q4HR 

PRN for PAIN, (Reported)


   Discontinued Reason: No Longer Taking


Labetalol Hcl (Labetalol Hcl), 2 TAB PO BID, (Reported)


   Discontinued Reason: No Longer Taking


Multivitamin (Multi-Day Vitamins), 1 TAB PO DAILY, (Reported)


   Discontinued Reason: No Longer Taking





Course


Blood Pressure Systolic:  168


Blood Pressure Diastolic:  94


Blood Pressure Mean:  118





Assessment/Plan


Assessment/Plan


Patient History:  


FH: lupus


  32 MOTHER, , Age:73


FH: lupus


  32 MOTHER, , Age:73


Hypertension


  32 MOTHER, , Age:73


  G8 BROTHER


No known health problems


  33 FATHER





No Family History of:


  Alzheimer's disease


  Asthma


  Cerebrovascular disorder


  Chronic obstructive pulmonary disease


  Congestive heart failure


  Diabetes insipidus


  Diabetes mellitus


  Parkinson's disease











VIRGILIO DELATORRE MD Mar 21, 2018 08:54

## 2018-03-21 NOTE — NUR
Pt up to bathroom with stand-by assist, pt voided 400ml of pale yellow urine with slight 
hematuria noted as well, pt c/o burning upon urination, small blood clot noted in urine. Pt 
verbalized concerned over burning, explained that this can be normal after having a catheter 
and it should get better within the next few voids. Pt verbalized understanding. Will 
continue to monitor.

## 2018-03-21 NOTE — CNH
DATE OF CONSULTATION:  03/20/2018



CONSULT/HISTORY AND PHYSICAL



REFERRINGPHYSICIAN:  Dr. Dougherty of Orthopedic surgery.



REASON FOR CONSULTATION:  Medical management of multiple medical problems.



HISTORY OF PRESENT ILLNESS:  The patient is a very pleasant 62-year-old man with

a past medical history significant for hypertension, coronary artery disease,

seizure disorder who presented after right total hip arthroplasty.  At time of

exam, pain was well controlled.  He was out of bed to chair.  He denied any

nausea.  He has a relatively good functional status.  There are no complaints of

chest pain or difficulty breathing.  He has had no recent medication changes.



PAST MEDICAL HISTORY:  Includes hypertension, coronary artery disease and

seizure disorder.



PAST SURGICAL HISTORY:  He has left total hip arthroplasty, now status post

right total hip arthroplasty.  He has cataract surgery, ORIF of left clavicle,

right knee arthroscopy, herniorrhaphy.



ALLERGIES:  ALLERGIC TO SULFA AND TRAMADOL.



HOME MEDICATIONS:  List includes Tylenol as needed, amlodipine 5 mg daily,

carvedilol 40 mg daily, Zyrtec 10 mg daily, Mucinex daily, hydrochlorothiazide

25 mg daily, probiotic daily, lamotrigine 150 mg twice a day, lisinopril 40 mg

daily and Aldactone 25 mg daily.



SOCIAL HISTORY:  Does live at home, has a good functional status.  Denies any

alcohol, tobacco or illicit drug use history.



FAMILY HISTORY:  Negative for early coronary artery disease and diabetes.



REVIEW OF SYSTEMS:

CARDIAC:  Denies chest pain, shortness of breath and dyspnea on exertion.

PULMONARY:  No cough, sputum production or pleuritic chest pain.

GASTROINTESTINAL:  No nausea, vomiting, diarrhea or constipation.

All else negative in 10 point review of system except as in HPI.



PHYSICAL EXAMINATION:

VITAL SIGNS:  At time of being seen, height 172.7 cm, weight 82.6 kilograms. 

Temperature 98.1, pulse 56, respiratory rate is 18, blood pressure 142/81 and O2

saturation 97% on room air.

GENERAL:  He is alert, in no acute distress at time of exam.

HEENT:  Pupils equal, round, reactive to light.  Sclerae are anicteric. 

Oropharynx is clear.  Mucous membranes are moist.

NECK:  Supple, no lymphadenopathy.

CARDIOVASCULAR:  At time of exam is regular rate and rhythm.

LUNGS:  Clear bilaterally.  No wheezing.

ABDOMEN:  Soft.  Bowel sounds are present, nontender to palpation.

EXTREMITIES:  No cyanosis, clubbing or significant edema.

NEUROLOGIC:  Grossly nonfocal.



LABORATORY DATA:  CBC:  White count is 9.2, hemoglobin 11.6 and platelets 215. 

Recently, he has sodium 129.



ASSESSMENT AND PLAN:  The patient is a 62-year-old man here with anemia due to

chronic disease secondary to iron sequestration after orthopedic surgery with

history of hypertension, hypertensive coronary artery disease, seizure disorder.

1.  We will continue his current cardiovascular medications.

2.  We will follow hemoglobin.  He does not need transfusion at this time.

3.  Continue his anti-seizure medications.  He has had no breakthrough seizures.

4.  DVT prophylaxis with factor 10a inhibitor.



Time spent on 03/20/2018 is 45 minutes.  This plan was discussed with the

patient.  He is his own decision maker.  He does understand and concur with

plans.





______________________________

Juan Manuel Moon MD



DR:  DOMINIC/ora  JOB# [id number]  [id number]

DD:  03/21/2018 16:55  DT:  03/21/2018 20:18

## 2018-03-21 NOTE — NUR
STATUS

PT WORKED WITH PT. PT AMBULATED APROX 200 FEET WITH STANDBY ASSIST. PT IN ROOM AT THIS TIME 
CALL LIGHT IN REACH

## 2018-03-22 VITALS — DIASTOLIC BLOOD PRESSURE: 85 MMHG | SYSTOLIC BLOOD PRESSURE: 139 MMHG

## 2018-03-22 VITALS — SYSTOLIC BLOOD PRESSURE: 139 MMHG | DIASTOLIC BLOOD PRESSURE: 85 MMHG

## 2018-03-22 VITALS — SYSTOLIC BLOOD PRESSURE: 141 MMHG | DIASTOLIC BLOOD PRESSURE: 77 MMHG

## 2018-03-22 VITALS — SYSTOLIC BLOOD PRESSURE: 161 MMHG | DIASTOLIC BLOOD PRESSURE: 86 MMHG

## 2018-03-22 VITALS — SYSTOLIC BLOOD PRESSURE: 148 MMHG | DIASTOLIC BLOOD PRESSURE: 96 MMHG

## 2018-03-22 RX ADMIN — Medication SCH MG: at 08:52

## 2018-03-22 RX ADMIN — FAMOTIDINE SCH MG: 20 TABLET, FILM COATED ORAL at 08:53

## 2018-03-22 RX ADMIN — SPIRONOLACTONE SCH MG: 25 TABLET, FILM COATED ORAL at 08:51

## 2018-03-22 RX ADMIN — LAMOTRIGINE SCH MG: 100 TABLET ORAL at 08:56

## 2018-03-22 RX ADMIN — GUAIFENESIN SCH MG: 600 TABLET, EXTENDED RELEASE ORAL at 08:52

## 2018-03-22 RX ADMIN — Medication SCH EACH: at 08:51

## 2018-03-22 RX ADMIN — ACETAMINOPHEN AND CODEINE PHOSPHATE PRN TABLET: 300; 60 TABLET ORAL at 11:53

## 2018-03-22 RX ADMIN — AMLODIPINE BESYLATE SCH MG: 5 TABLET ORAL at 08:50

## 2018-03-22 RX ADMIN — Medication SCH MG: at 08:56

## 2018-03-22 RX ADMIN — ACETAMINOPHEN AND CODEINE PHOSPHATE PRN TABLET: 300; 60 TABLET ORAL at 01:50

## 2018-03-22 RX ADMIN — RIVAROXABAN SCH MG: 10 TABLET, FILM COATED ORAL at 08:56

## 2018-03-22 RX ADMIN — CARVEDILOL SCH MG: 12.5 TABLET, FILM COATED ORAL at 09:09

## 2018-03-22 RX ADMIN — LORATADINE SCH MG: 10 TABLET ORAL at 08:51

## 2018-03-22 RX ADMIN — LISINOPRIL SCH MG: 20 TABLET ORAL at 08:50

## 2018-03-22 NOTE — NUR
DISCHARGE



Pt discharged. Gave pt discharge instructions and pt has no concerns or questions @ this 
time. Pt transported off of unit by [facility] staff via wheelchair. No s/s of distress noted @ 
time of discharge

## 2018-03-22 NOTE — NUR
Pt up to bathroom to void, one small blood clot again noted in pale yellow urine, no further 
blood noted in urine at this time. Pt verbalized concern over continued blood clots in 
urine, pt denies any burning with urination for this void, explained to patient again that 
this can be expected after having a urinary catheter and being on blood thinners, explained 
that it should clear up in the next few voids. Pt verbalized understanding, will continue to 
monitor output closely.

## 2018-03-22 NOTE — NUR
Pt again up to void, no blood clots in urine noted, urine is not blood tinged and pt denies 
c/o pain with voiding. Will continue to monitor output closely.

## 2018-03-22 NOTE — PRM.PN
Subjective


Subjective


Date:  Mar 22, 2018


Time:  12:37


Subjective


Independent with PT


Pain ok


VSS


Wound ok


Will dc


Patient History:  


FH: lupus


  32 MOTHER, , Age:73


FH: lupus


  32 MOTHER, , Age:73


Hypertension


  32 MOTHER, , Age:73


  G8 BROTHER


No known health problems


  33 FATHER





No Family History of:


  Alzheimer's disease


  Asthma


  Cerebrovascular disorder


  Chronic obstructive pulmonary disease


  Congestive heart failure


  Diabetes insipidus


  Diabetes mellitus


  Parkinson's disease





VTE


VTE Risk Total Score:  2


VTE Risk Score


VTE Risk:


Score 0-1 = Low Risk


(Aggressive mobilization; early ambulation; no VTE prophylaxis required)


Score 2: Moderate Risk


(Intermittent/Pneumatic Compression Device OR Lovenox/Heparin/Coumadin)


Score 3-4: High Risk


(Intermittent/Pneumatic Compression Device AND Lovenox/Heparin/Coumadin)


Score  > or =5: Highest Risk


(Intermittent/Pneumatic Compression Device AND Lovenox/Heparin/Coumadin)





Review of Systems


Allergies:  


Coded Allergies:  


     Sulfa (Sulfonamide Antibiotics) (Verified  Allergy, Severe, RASH ON FACE, 3

/15/18)


     tramadol (Verified  Allergy, Severe, SEIZURES, 3/15/18)


Scheduled


Amlodipine Besylate (Amlodipine Besylate), 1 TAB PO DAILY, (Reported)


Carvedilol Phosphate (Coreg Cr), 2 CAP PO DAILY, (Reported)


Cetirizine Hcl (Zyrtec), 1 TAB PO DAILY, (Reported)


Guaifenesin (Mucinex), 1 TAB PO DAILY, (Reported)


Hydrochlorothiazide (Hydrochlorothiazide), 1 TAB PO DAILY, (Reported)


Lactobacillus Acidophilus (Probiotic), 1 EACH PO DAILY, (Reported)


Lamotrigine (Lamotrigine), 1 TAB PO BID, (Reported)


Lisinopril (Lisinopril), 1 TAB PO DAILY, (Reported)


Spironolactone 25MG (Aldactone 25MG), 1 TAB PO DAILY, (Reported)





Scheduled PRN


Acetaminophen (Acetaminophen), 2 TAB PO TID PRN for PAIN, (Reported)





Discontinued Medications


Acetaminophen With Codeine (Tylenol With Codeine #4 Tablet), 1-2 EACH PO Q4HR 

PRN for PAIN, (Reported)


   Discontinued Reason: No Longer Taking


Labetalol Hcl (Labetalol Hcl), 2 TAB PO BID, (Reported)


   Discontinued Reason: No Longer Taking


Multivitamin (Multi-Day Vitamins), 1 TAB PO DAILY, (Reported)


   Discontinued Reason: No Longer Taking





Objective


Vitals and I/O





Vital Sign - Last 24 Hours








 3/21/18 3/21/18 3/21/18 3/21/18





 19:45 20:05 21:14 21:38


 


Temp  98.4  


 


Pulse  58 60 58


 


Resp  20  16


 


B/P (MAP)  151/87 (108) 151/87 


 


Pulse Ox  97  94


 


O2 Delivery Room Air Room Air  Room Air


 


    





 3/22/18 3/22/18 3/22/18 3/22/18





 00:39 05:33 07:21 07:46


 


Temp 97.7 97.9 98.4 


 


Pulse 54 63 58 


 


Resp 16 16 16 


 


B/P (MAP) 161/86 (111) 141/77 (98) 148/96 (113) 


 


Pulse Ox 96 96 96 


 


O2 Delivery Room Air Room Air Room Air Room Air


 


    





 3/22/18 3/22/18 3/22/18 3/22/18





 07:57 08:50 08:50 08:51


 


Pulse 58 58  


 


Resp 16   


 


B/P (MAP)  148/96 148/96 148/96


 


Pulse Ox 96   


 


O2 Delivery Room Air   


 


FiO2 21   





 3/22/18 3/22/18 3/22/18 





 08:52 09:09 11:27 


 


Temp   98.3 


 


Pulse  58 60 


 


Resp   16 


 


B/P (MAP) 148/96 148/96 139/85 (103) 


 


Pulse Ox   94 


 


O2 Delivery   Room Air 














Intake and Output   


 


 3/21/18 3/21/18 3/22/18





 15:00 23:00 07:00


 


Intake Total  2800 ml 580 ml


 


Output Total 300 ml 2100 ml 2900 ml


 


Balance -300 ml 700 ml -2320 ml








Medication Reconciliation


Scheduled


Amlodipine Besylate (Amlodipine Besylate), 1 TAB PO DAILY, (Reported)


Carvedilol Phosphate (Coreg Cr), 2 CAP PO DAILY, (Reported)


Cetirizine Hcl (Zyrtec), 1 TAB PO DAILY, (Reported)


Guaifenesin (Mucinex), 1 TAB PO DAILY, (Reported)


Hydrochlorothiazide (Hydrochlorothiazide), 1 TAB PO DAILY, (Reported)


Lactobacillus Acidophilus (Probiotic), 1 EACH PO DAILY, (Reported)


Lamotrigine (Lamotrigine), 1 TAB PO BID, (Reported)


Lisinopril (Lisinopril), 1 TAB PO DAILY, (Reported)


Spironolactone 25MG (Aldactone 25MG), 1 TAB PO DAILY, (Reported)





Scheduled PRN


Acetaminophen (Acetaminophen), 2 TAB PO TID PRN for PAIN, (Reported)





Discontinued Medications


Acetaminophen With Codeine (Tylenol With Codeine #4 Tablet), 1-2 EACH PO Q4HR 

PRN for PAIN, (Reported)


   Discontinued Reason: No Longer Taking


Labetalol Hcl (Labetalol Hcl), 2 TAB PO BID, (Reported)


   Discontinued Reason: No Longer Taking


Multivitamin (Multi-Day Vitamins), 1 TAB PO DAILY, (Reported)


   Discontinued Reason: No Longer Taking





Course


Blood Pressure Systolic:  139


Blood Pressure Diastolic:  85


Blood Pressure Mean:  103





Assessment/Plan


Assessment/Plan


Patient History:  


FH: lupus


  32 MOTHER, , Age:73


FH: lupus


  32 MOTHER, , Age:73


Hypertension


  32 MOTHER, , Age:73


  G8 BROTHER


No known health problems


  33 FATHER





No Family History of:


  Alzheimer's disease


  Asthma


  Cerebrovascular disorder


  Chronic obstructive pulmonary disease


  Congestive heart failure


  Diabetes insipidus


  Diabetes mellitus


  Parkinson's disease











VIRGILIO DELATORRE MD Mar 22, 2018 12:37

## 2018-03-22 NOTE — DSH
DATE OF DISCHARGE:  03/22/2018



ADMITTING DIAGNOSIS:  Osteoarthritis of the right hip.



OTHER DIAGNOSES:  Include hypertension and seizure disorder.



DISCHARGE DIAGNOSES:  Osteoarthritis of the right hip plus postoperative anemia,

expected.  



OPERATIVE PROCEDURE DATE:  03/19/2018.



PROCEDURE PERFORMED:  Right total hip arthroplasty.



CONSULTATIONS:  Will be Dr. Moon.



COMPLICATIONS:  None.



SUMMARY OF ADMISSION:  The patient is a 62-year-old male with right hip pain off

and on for several years, but worse in the last 3 months.  He had pain that he

rated a 9/10.  He had to use a cane and had pain with just household ambulation.

 The x-rays showed that he had bone-on-bone about the right hip.  He was taken

to the operating room on 03/19/2018 for right total hip arthroplasty.



The patient's surgery was performed without incident.  Postoperatively, he was

awake and alert, had a normal neurovascular exam.  He has had physical therapy

and occupational therapy for gait training and ADLs.  He has been on a regular

diet throughout his postoperative course.  He tolerated it well.  He has been on

Xarelto as well as foot pump, SCDs and early ambulation for DVT prophylaxis.  On

discharge, the patient was independent with his PT and his OT.  His wound was

benign.  His most recent hemoglobin was 12.  The patient will be instructed to

leave his Aquacel dressing intact.  He will use his walker to ambulate and

weightbear as tolerated.  He will use ice packs 3 times a day for an hour and

see me in the office in 1 week.  Otherwise, he will be asked to use aspirin 325

mg twice a day for a month for DVT prophylaxis.  We will give him a prescription

for Tylenol #4 for the pain.





______________________________

Mich Dougherty MD



DR:  TIERNEY/ora  JOB# [id number]  [id number]

DD:  03/22/2018 12:43  DT:  03/22/2018 22:08